# Patient Record
Sex: MALE | Employment: OTHER | URBAN - METROPOLITAN AREA
[De-identification: names, ages, dates, MRNs, and addresses within clinical notes are randomized per-mention and may not be internally consistent; named-entity substitution may affect disease eponyms.]

---

## 2021-06-22 ENCOUNTER — APPOINTMENT (OUTPATIENT)
Dept: CT IMAGING | Facility: CLINIC | Age: 66
DRG: 378 | End: 2021-06-22
Attending: EMERGENCY MEDICINE
Payer: COMMERCIAL

## 2021-06-22 ENCOUNTER — HOSPITAL ENCOUNTER (OUTPATIENT)
Facility: CLINIC | Age: 66
End: 2021-06-22
Attending: HOSPITALIST | Admitting: HOSPITALIST
Payer: COMMERCIAL

## 2021-06-22 ENCOUNTER — HOSPITAL ENCOUNTER (INPATIENT)
Facility: CLINIC | Age: 66
LOS: 2 days | Discharge: HOME | DRG: 378 | End: 2021-06-24
Attending: EMERGENCY MEDICINE | Admitting: INTERNAL MEDICINE
Payer: COMMERCIAL

## 2021-06-22 DIAGNOSIS — R91.8 PULMONARY NODULES: ICD-10-CM

## 2021-06-22 DIAGNOSIS — K92.2 GASTROINTESTINAL HEMORRHAGE, UNSPECIFIED GASTROINTESTINAL HEMORRHAGE TYPE: ICD-10-CM

## 2021-06-22 DIAGNOSIS — R55 SYNCOPE AND COLLAPSE: ICD-10-CM

## 2021-06-22 LAB
ALBUMIN SERPL-MCNC: 2.9 G/DL (ref 3.4–5)
ALP SERPL-CCNC: 64 U/L (ref 40–150)
ALT SERPL W P-5'-P-CCNC: 29 U/L (ref 0–70)
ANION GAP SERPL CALCULATED.3IONS-SCNC: 9 MMOL/L (ref 3–14)
APTT PPP: 28 SEC (ref 22–37)
AST SERPL W P-5'-P-CCNC: 16 U/L (ref 0–45)
BASOPHILS # BLD AUTO: 0 10E9/L (ref 0–0.2)
BASOPHILS NFR BLD AUTO: 0.2 %
BILIRUB SERPL-MCNC: 0.8 MG/DL (ref 0.2–1.3)
BLD PROD TYP BPU: NORMAL
BLD UNIT ID BPU: 0
BLOOD PRODUCT CODE: NORMAL
BPU ID: NORMAL
BUN SERPL-MCNC: 48 MG/DL (ref 7–30)
CALCIUM SERPL-MCNC: 7.9 MG/DL (ref 8.5–10.1)
CHLORIDE SERPL-SCNC: 108 MMOL/L (ref 94–109)
CK SERPL-CCNC: 54 U/L (ref 30–300)
CO2 SERPL-SCNC: 23 MMOL/L (ref 20–32)
CREAT SERPL-MCNC: 0.87 MG/DL (ref 0.66–1.25)
DIFFERENTIAL METHOD BLD: ABNORMAL
EOSINOPHIL # BLD AUTO: 0 10E9/L (ref 0–0.7)
EOSINOPHIL NFR BLD AUTO: 0.1 %
ERYTHROCYTE [DISTWIDTH] IN BLOOD BY AUTOMATED COUNT: 15.8 % (ref 10–15)
ETHANOL SERPL-MCNC: <0.01 G/DL
GFR SERPL CREATININE-BSD FRML MDRD: 90 ML/MIN/{1.73_M2}
GLUCOSE SERPL-MCNC: 172 MG/DL (ref 70–99)
HBA1C MFR BLD: NORMAL % (ref 0–5.6)
HCT VFR BLD AUTO: 23.3 % (ref 40–53)
HEMOCCULT STL QL: POSITIVE
HGB BLD-MCNC: 7.3 G/DL (ref 13.3–17.7)
HGB BLD-MCNC: 7.9 G/DL (ref 13.3–17.7)
IMM GRANULOCYTES # BLD: 0.2 10E9/L (ref 0–0.4)
IMM GRANULOCYTES NFR BLD: 1.3 %
INR PPP: 1.06 (ref 0.86–1.14)
INTERPRETATION ECG - MUSE: NORMAL
LABORATORY COMMENT REPORT: NORMAL
LACTATE BLD-SCNC: 3.9 MMOL/L (ref 0.7–2)
LYMPHOCYTES # BLD AUTO: 3.6 10E9/L (ref 0.8–5.3)
LYMPHOCYTES NFR BLD AUTO: 18.9 %
MCH RBC QN AUTO: 26.4 PG (ref 26.5–33)
MCHC RBC AUTO-ENTMCNC: 31.3 G/DL (ref 31.5–36.5)
MCV RBC AUTO: 84 FL (ref 78–100)
MONOCYTES # BLD AUTO: 0.8 10E9/L (ref 0–1.3)
MONOCYTES NFR BLD AUTO: 4.4 %
NEUTROPHILS # BLD AUTO: 14.3 10E9/L (ref 1.6–8.3)
NEUTROPHILS NFR BLD AUTO: 75.1 %
NRBC # BLD AUTO: 0 10*3/UL
NRBC BLD AUTO-RTO: 0 /100
PLATELET # BLD AUTO: 253 10E9/L (ref 150–450)
POTASSIUM SERPL-SCNC: 3.8 MMOL/L (ref 3.4–5.3)
PROT SERPL-MCNC: 5.8 G/DL (ref 6.8–8.8)
RBC # BLD AUTO: 2.77 10E12/L (ref 4.4–5.9)
SARS-COV-2 RNA RESP QL NAA+PROBE: NEGATIVE
SODIUM SERPL-SCNC: 140 MMOL/L (ref 133–144)
SPECIMEN SOURCE: NORMAL
TRANSFUSION STATUS PATIENT QL: NORMAL
TRANSFUSION STATUS PATIENT QL: NORMAL
TROPONIN I SERPL-MCNC: 0.02 UG/L (ref 0–0.04)
TSH SERPL DL<=0.005 MIU/L-ACNC: 3.54 MU/L (ref 0.4–4)
WBC # BLD AUTO: 19 10E9/L (ref 4–11)

## 2021-06-22 PROCEDURE — 82272 OCCULT BLD FECES 1-3 TESTS: CPT | Performed by: EMERGENCY MEDICINE

## 2021-06-22 PROCEDURE — 85730 THROMBOPLASTIN TIME PARTIAL: CPT | Performed by: EMERGENCY MEDICINE

## 2021-06-22 PROCEDURE — 83036 HEMOGLOBIN GLYCOSYLATED A1C: CPT | Performed by: EMERGENCY MEDICINE

## 2021-06-22 PROCEDURE — C9803 HOPD COVID-19 SPEC COLLECT: HCPCS

## 2021-06-22 PROCEDURE — 96365 THER/PROPH/DIAG IV INF INIT: CPT

## 2021-06-22 PROCEDURE — 87635 SARS-COV-2 COVID-19 AMP PRB: CPT | Performed by: EMERGENCY MEDICINE

## 2021-06-22 PROCEDURE — 250N000011 HC RX IP 250 OP 636: Performed by: INTERNAL MEDICINE

## 2021-06-22 PROCEDURE — P9016 RBC LEUKOCYTES REDUCED: HCPCS | Performed by: EMERGENCY MEDICINE

## 2021-06-22 PROCEDURE — 250N000009 HC RX 250: Performed by: EMERGENCY MEDICINE

## 2021-06-22 PROCEDURE — C9113 INJ PANTOPRAZOLE SODIUM, VIA: HCPCS | Performed by: INTERNAL MEDICINE

## 2021-06-22 PROCEDURE — 74177 CT ABD & PELVIS W/CONTRAST: CPT

## 2021-06-22 PROCEDURE — 83605 ASSAY OF LACTIC ACID: CPT | Performed by: EMERGENCY MEDICINE

## 2021-06-22 PROCEDURE — 86923 COMPATIBILITY TEST ELECTRIC: CPT | Performed by: EMERGENCY MEDICINE

## 2021-06-22 PROCEDURE — 250N000011 HC RX IP 250 OP 636: Performed by: EMERGENCY MEDICINE

## 2021-06-22 PROCEDURE — 36415 COLL VENOUS BLD VENIPUNCTURE: CPT | Performed by: PHYSICIAN ASSISTANT

## 2021-06-22 PROCEDURE — 82077 ASSAY SPEC XCP UR&BREATH IA: CPT | Performed by: EMERGENCY MEDICINE

## 2021-06-22 PROCEDURE — 120N000001 HC R&B MED SURG/OB

## 2021-06-22 PROCEDURE — 80053 COMPREHEN METABOLIC PANEL: CPT | Performed by: EMERGENCY MEDICINE

## 2021-06-22 PROCEDURE — 99223 1ST HOSP IP/OBS HIGH 75: CPT | Mod: AI | Performed by: INTERNAL MEDICINE

## 2021-06-22 PROCEDURE — 86900 BLOOD TYPING SEROLOGIC ABO: CPT | Performed by: EMERGENCY MEDICINE

## 2021-06-22 PROCEDURE — 85018 HEMOGLOBIN: CPT | Performed by: PHYSICIAN ASSISTANT

## 2021-06-22 PROCEDURE — 250N000011 HC RX IP 250 OP 636: Performed by: PHYSICIAN ASSISTANT

## 2021-06-22 PROCEDURE — 86850 RBC ANTIBODY SCREEN: CPT | Performed by: EMERGENCY MEDICINE

## 2021-06-22 PROCEDURE — 85610 PROTHROMBIN TIME: CPT | Performed by: EMERGENCY MEDICINE

## 2021-06-22 PROCEDURE — 87040 BLOOD CULTURE FOR BACTERIA: CPT | Performed by: EMERGENCY MEDICINE

## 2021-06-22 PROCEDURE — 258N000003 HC RX IP 258 OP 636: Performed by: EMERGENCY MEDICINE

## 2021-06-22 PROCEDURE — 84443 ASSAY THYROID STIM HORMONE: CPT | Performed by: EMERGENCY MEDICINE

## 2021-06-22 PROCEDURE — 86901 BLOOD TYPING SEROLOGIC RH(D): CPT | Performed by: EMERGENCY MEDICINE

## 2021-06-22 PROCEDURE — 96360 HYDRATION IV INFUSION INIT: CPT | Mod: 59

## 2021-06-22 PROCEDURE — 258N000003 HC RX IP 258 OP 636: Performed by: INTERNAL MEDICINE

## 2021-06-22 PROCEDURE — 99285 EMERGENCY DEPT VISIT HI MDM: CPT | Mod: 25

## 2021-06-22 PROCEDURE — 258N000003 HC RX IP 258 OP 636: Performed by: PHYSICIAN ASSISTANT

## 2021-06-22 PROCEDURE — 82550 ASSAY OF CK (CPK): CPT | Performed by: EMERGENCY MEDICINE

## 2021-06-22 PROCEDURE — 99207 PR APP CREDIT; MD BILLING SHARED VISIT: CPT | Performed by: PHYSICIAN ASSISTANT

## 2021-06-22 PROCEDURE — C9113 INJ PANTOPRAZOLE SODIUM, VIA: HCPCS | Performed by: EMERGENCY MEDICINE

## 2021-06-22 PROCEDURE — 93005 ELECTROCARDIOGRAM TRACING: CPT

## 2021-06-22 PROCEDURE — 84484 ASSAY OF TROPONIN QUANT: CPT | Performed by: EMERGENCY MEDICINE

## 2021-06-22 PROCEDURE — 85025 COMPLETE CBC W/AUTO DIFF WBC: CPT | Performed by: EMERGENCY MEDICINE

## 2021-06-22 RX ORDER — PROCHLORPERAZINE MALEATE 5 MG
5 TABLET ORAL EVERY 6 HOURS PRN
Status: DISCONTINUED | OUTPATIENT
Start: 2021-06-22 | End: 2021-06-24 | Stop reason: HOSPADM

## 2021-06-22 RX ORDER — IOPAMIDOL 755 MG/ML
500 INJECTION, SOLUTION INTRAVASCULAR ONCE
Status: COMPLETED | OUTPATIENT
Start: 2021-06-22 | End: 2021-06-22

## 2021-06-22 RX ORDER — BISACODYL 10 MG
10 SUPPOSITORY, RECTAL RECTAL DAILY PRN
Status: DISCONTINUED | OUTPATIENT
Start: 2021-06-22 | End: 2021-06-24 | Stop reason: HOSPADM

## 2021-06-22 RX ORDER — MULTIPLE VITAMINS W/ MINERALS TAB 9MG-400MCG
1 TAB ORAL DAILY
COMMUNITY

## 2021-06-22 RX ORDER — PROCHLORPERAZINE 25 MG
12.5 SUPPOSITORY, RECTAL RECTAL EVERY 12 HOURS PRN
Status: DISCONTINUED | OUTPATIENT
Start: 2021-06-22 | End: 2021-06-24 | Stop reason: HOSPADM

## 2021-06-22 RX ORDER — AMOXICILLIN 250 MG
1 CAPSULE ORAL 2 TIMES DAILY PRN
Status: DISCONTINUED | OUTPATIENT
Start: 2021-06-22 | End: 2021-06-24 | Stop reason: HOSPADM

## 2021-06-22 RX ORDER — ONDANSETRON 2 MG/ML
4 INJECTION INTRAMUSCULAR; INTRAVENOUS EVERY 6 HOURS PRN
Status: DISCONTINUED | OUTPATIENT
Start: 2021-06-22 | End: 2021-06-24 | Stop reason: HOSPADM

## 2021-06-22 RX ORDER — SODIUM CHLORIDE 9 MG/ML
INJECTION, SOLUTION INTRAVENOUS CONTINUOUS
Status: DISCONTINUED | OUTPATIENT
Start: 2021-06-22 | End: 2021-06-22

## 2021-06-22 RX ORDER — AMOXICILLIN 250 MG
2 CAPSULE ORAL 2 TIMES DAILY PRN
Status: DISCONTINUED | OUTPATIENT
Start: 2021-06-22 | End: 2021-06-24 | Stop reason: HOSPADM

## 2021-06-22 RX ORDER — ACETAMINOPHEN 325 MG/1
650 TABLET ORAL EVERY 4 HOURS PRN
Status: DISCONTINUED | OUTPATIENT
Start: 2021-06-22 | End: 2021-06-24 | Stop reason: HOSPADM

## 2021-06-22 RX ORDER — LIDOCAINE 40 MG/G
CREAM TOPICAL
Status: DISCONTINUED | OUTPATIENT
Start: 2021-06-22 | End: 2021-06-24 | Stop reason: HOSPADM

## 2021-06-22 RX ORDER — ONDANSETRON 4 MG/1
4 TABLET, ORALLY DISINTEGRATING ORAL EVERY 6 HOURS PRN
Status: DISCONTINUED | OUTPATIENT
Start: 2021-06-22 | End: 2021-06-24 | Stop reason: HOSPADM

## 2021-06-22 RX ADMIN — SODIUM CHLORIDE 8 MG/HR: 9 INJECTION, SOLUTION INTRAVENOUS at 23:34

## 2021-06-22 RX ADMIN — SODIUM CHLORIDE 500 ML: 9 INJECTION, SOLUTION INTRAVENOUS at 14:32

## 2021-06-22 RX ADMIN — SODIUM CHLORIDE 1000 ML: 9 INJECTION, SOLUTION INTRAVENOUS at 11:56

## 2021-06-22 RX ADMIN — PANTOPRAZOLE SODIUM 80 MG: 40 INJECTION, POWDER, FOR SOLUTION INTRAVENOUS at 11:54

## 2021-06-22 RX ADMIN — SODIUM CHLORIDE 65 ML: 9 INJECTION, SOLUTION INTRAVENOUS at 13:30

## 2021-06-22 RX ADMIN — IOPAMIDOL 100 ML: 755 INJECTION, SOLUTION INTRAVENOUS at 13:30

## 2021-06-22 RX ADMIN — SODIUM CHLORIDE 8 MG/HR: 9 INJECTION, SOLUTION INTRAVENOUS at 13:23

## 2021-06-22 RX ADMIN — POTASSIUM CHLORIDE: 2 INJECTION, SOLUTION, CONCENTRATE INTRAVENOUS at 19:44

## 2021-06-22 ASSESSMENT — ENCOUNTER SYMPTOMS
LIGHT-HEADEDNESS: 1
BLOOD IN STOOL: 1
WEAKNESS: 1

## 2021-06-22 ASSESSMENT — MIFFLIN-ST. JEOR
SCORE: 2288.12
SCORE: 2183.79

## 2021-06-22 ASSESSMENT — ACTIVITIES OF DAILY LIVING (ADL): ADLS_ACUITY_SCORE: 16

## 2021-06-22 NOTE — PHARMACY-ADMISSION MEDICATION HISTORY
Admission medication history interview status for this patient is complete. See [facility] admission navigator for allergy information, prior to admission medications and immunization status.     Medication history interview done, indicate source(s): Patient  Medication history resources (including written lists, pill bottles, clinic record):None  Pharmacy: N/A    Changes made to PTA medication list:  Added: Excedrin  Deleted: None  Changed: None    Actions taken by pharmacist (provider contacted, etc):None     Additional medication history information:None    Medication reconciliation/reorder completed by provider prior to medication history?  No        Prior to Admission medications    Medication Sig Last Dose Taking? Auth Provider   aspirin-acetaminophen-caffeine (EXCEDRIN MIGRAINE) 250-250-65 MG tablet Take 1 tablet by mouth every 6 hours as needed for headaches Past Week at Unknown time Yes Unknown, Entered By History   multivitamin w/minerals (MULTI-VITAMIN) tablet Take 1 tablet by mouth daily 6/21/2021 at Unknown time Yes Unknown, Entered By History

## 2021-06-22 NOTE — ED NOTES
Bed: ED19  Expected date: 6/22/21  Expected time: 11:06 AM  Means of arrival: Ambulance  Comments:  BV 65 M possible GI bleed

## 2021-06-22 NOTE — ED PROVIDER NOTES
History   Chief Complaint:  Melena and Syncope       HPI   Mason Garibay is a 65 year old male with history of morbid obesity and an adenomatous polyp of colon who presents with melena and syncope. The patient reports that he started experiencing weakness and lightheadedness three days ago after a Father's Day party at his son's house. Yesterday, everytime the patient stood up to use the restroom, he experienced shakiness, nausea, and lightheadedness. At one point during the day, he blacked out. When he woke up, he was lying on the floor. He does not know how this occurred or how long he was on the floor. This morning, he told his son about the incident, who called EMS. His last bowel movement at 0000 this morning was dark in coloration. He experienced similar stools seven or eight years ago. A colonoscopy was performed at this time, with no significant abnormal findings. Excedrin is the only medication the patient uses regularly. He last took this 4-5 days ago.       Review of Systems   Gastrointestinal: Positive for blood in stool.   Neurological: Positive for syncope, weakness and light-headedness.   All other systems reviewed and are negative.        Allergies:  Clindamycin    Medications:  No known current medications    Past Medical History:    Morbid obesity  Umbilical hernia  Adenomatous polyp of colon  Glucose intolerance    Past Surgical History:    Polypectomy    Family History:    Diabetes, mother  Hypertension, mother  Depression, mother  Thyroid disease, father  Respiratory, father    Social History:  Arrives via EMS  Unaccompanied in ED    Physical Exam     Patient Vitals for the past 24 hrs:   BP Temp Temp src Pulse Resp SpO2 Height Weight   06/22/21 1515 116/85 -- -- 112 17 99 % -- --   06/22/21 1500 115/83 -- -- 110 14 99 % -- --   06/22/21 1445 121/82 98  F (36.7  C) Oral 112 14 98 % -- --   06/22/21 1430 106/76 99.1  F (37.3  C) Oral 112 20 100 % -- --   06/22/21 1415 117/85 98.9  F (37.2  C) --  118 16 99 % -- --   06/22/21 1400 117/77 -- -- 108 14 100 % -- --   06/22/21 1315 118/76 -- -- 113 12 -- -- --   06/22/21 1300 131/88 -- -- 113 24 96 % -- --   06/22/21 1250 -- -- -- 117 25 96 % -- --   06/22/21 1245 (!) 123/96 -- -- 114 13 98 % -- --   06/22/21 1240 -- -- -- 115 28 98 % -- --   06/22/21 1235 -- -- -- 116 28 96 % -- --   06/22/21 1230 121/78 -- -- 111 15 96 % -- --   06/22/21 1220 -- -- -- 113 16 96 % -- --   06/22/21 1215 115/79 -- -- 117 (!) 6 96 % -- --   06/22/21 1210 -- -- -- 121 21 99 % -- --   06/22/21 1205 -- -- -- 117 10 (!) 86 % -- --   06/22/21 1200 124/78 -- -- 117 16 96 % -- --   06/22/21 1145 (!) 139/92 -- -- 117 17 98 % -- --   06/22/21 1130 (!) 153/111 -- -- 121 8 98 % -- --   06/22/21 1118 (!) 160/90 98.4  F (36.9  C) Oral 125 18 97 % 1.829 m (6') 136.1 kg (300 lb)   06/22/21 1115 (!) 144/103 -- -- 125 26 97 % -- --       Physical Exam  General: The patient is alert, in no respiratory distress.    HENT: Mucous membranes moist.    Cardiovascular: Regular rate and rhythm. Good pulses in all four extremities. Normal capillary refill and skin turgor.     Respiratory: Lungs are clear. No nasal flaring. No retractions. No wheezing, no crackles.    Gastrointestinal: Abdomen soft. No guarding, no rebound. No palpable hernias.     Musculoskeletal: No gross deformity.     Skin: No rashes or petechiae. Pale.     Neurologic: The patient is alert and oriented x3. GCS 15. No testable cranial nerve deficit. Follows commands with clear and appropriate speech. Gives appropriate answers. Good strength in all extremities. No gross neurologic deficit. Gross sensation intact. Pupils are round and reactive. No meningismus.     Lymphatic: No cervical adenopathy. No lower extremity swelling.    Psychiatric: The patient is non-tearful.    Rectal: Black stool    Emergency Department Course   ECG  ECG taken at 1117, ECG read at 1130  Sinus tachycardia with occasional premature ventricular  complexes  Nonspecific ST abnormality  Abnormal ECG   Rate 124 bpm. ID interval 156 ms. QRS duration 84 ms. QT/QTc 322/462 ms. P-R-T axes 31 -15 14.     Imaging:    CT Abdomen Pelvis w Contrast  1. No evidence of acute pathology in the abdomen or pelvis.  2. A few basilar pulmonary nodules including 4 mm right middle and  lower lobe nodules , as per Fleischner's society criteria, for low  risk patient no routine follow-up is recommended, and for high-risk  patient, optional chest CT in 12 months can be considered.  3. Multiple small nodular groundglass opacities in the subcutaneous  tissue of the anterior abdominal wall, likely related to medications  injections.    CHARAN RODRIGUEZ MD    Reads per radiology    Laboratory:    CBC: WBC 19.0 (H), HGB 7.3 (L),     CMP: glucose 172 (H), BUN 48 (H), calcium 7.9 (L), albumin 2.9 (L), pretein total 5.8 (L) o/w WNL (Creatinine 0.87)     INR: 1.06    PTT: 28    Troponin (Collected 1123): 0.020    ABO RH Type and Screen: O, antibody Positive    Alcohol level blood: <0.01    Emergency Department Course:    Reviewed:  I reviewed nursing notes, vitals, past medical history and care everywhere    Assessments:  1110 I obtained history and examined the patient as noted above.   1132 I rechecked the patient  1204 I rechecked the patient.   1358 I rechecked the patient. His heart rate has decreased to 118 bpm.   1411 I rechecked the patient and explained finding. He was already aware of the nodule in his lung.     Consults:   1214 I consulted with Dr. Chris MD, of GI  1421 I consulted with CARLOS Mcknight PA-C, for Dr. Rakan MD, of the hospitalist service, who agrees to admit the patient.   1500 No beds available at [facility], so the patient will be transferred to [place]  1530 I consulted with Dr. Zo MD, of the hospitalist service, who agrees to accept the patient for transfer at [facility].    Interventions:  1154 Pantoprazole 80 mg IV  1156 Normal Saline 1000 mL  IV  1323 Protonix 8 mg/hr IV  1432 Normal Saline 500 mL IV    Disposition:  The patient was admitted to the hospital under the care of Dr. Zo MD.      Impression & Plan       Medical Decision Making:  Patient presented after a syncopal episode with black stools.  With his history of previous GI bleed I was highly concerned about a GI bleed.  He was also very tachycardic.  I did aggressively hydrate him I do think that his elevated lactic acid level is likely secondary to dehydration.  The patient has a benign abdominal exam has a hernia present I did consider mesenteric ischemia or diverticulitis and therefore the CT scan thankfully is not present.  Discussed incidental findings with him.  He was transfused and given IV fluid for hydration.  I held on antibiotics and I do not think he is likely septic.  I discussed the case with GI and is due for upper endoscopy blood transfusion was started here.  Originally thought the patient would have to be transported to [facility] however we will find a bed for him here I talked to the hospitalist here as well as at [facility].  His heart rate came down he was very stable and the patient was admitted to hospital in good condition receiving blood and IV fluid.    Critical Care Time: was 75  minutes for this patient excluding procedures    Covid-19  Mason Garibay was evaluated during a global COVID-19 pandemic, which necessitated consideration that the patient might be at risk for infection with the SARS-CoV-2 virus that causes COVID-19.   Applicable protocols for evaluation were followed during the patient's care.   COVID-19 was considered as part of the patient's evaluation. The plan for testing is:  a test was obtained during this visit.    Diagnosis:    ICD-10-CM    1. Gastrointestinal hemorrhage, unspecified gastrointestinal hemorrhage type  K92.2 Blood culture     Blood culture   2. Syncope and collapse  R55    3. Pulmonary nodules  R91.8      Scribe Disclosure:  I  Ari Bourgeois, am serving as a scribe at 11:15 AM on 6/22/2021 to document services personally performed by Alex Hargrove MD based on my observations and the provider's statements to me.              Alex Hargrove MD  06/22/21 1550

## 2021-06-22 NOTE — ED TRIAGE NOTES
Patient arrives via EMS from home, states that he had black stools two times since yesterday, syncopal episode today, unwitnessed.  via EMS.     Patient tachy upon arrival, otherwise vitally stable. A&O x4.     Julianna Raymond RN

## 2021-06-22 NOTE — CONSULTS
[facility]  Gastroenterology Consultation         Levi Perez MD    Patient Name: Mason Garibay MRN# [id number]   YOB: 1955 Age: 65 year old      Date of Admission:  6/22/2021  Today's Date: 06/22/2021         Assessment and Plan:     Impression:  1.  65-year-old gentleman with what appears to be upper GI bleeding almost certainly from nonsteroidal/aspirin based ulceration/gastropathy/duodenoscopy.  Hemodynamics improving after fluid administration.  Blood has been ordered.  Leukocytosis secondary likely from demargination from acute illness, lactic acid likely elevated due to dehydration.  Page-Byers tear, AVMs of the stomach and small bowel much less likely.  No history to suggest lower GI bleeding or variceal upper GI bleeding.  2.  Stable medical problems otherwise under the care admitting team and outpatient provider.    Recommendations:  1.  Case and findings discussed with emergency room staff.  2.  Recommend admission and appropriate fluid resuscitation including blood product.  3.  If hemodynamics remain stable as anticipated, recommend EGD tomorrow after fluid resuscitated and lactic acid back to normal.  4.  Monitor H&H and transfuse if indicated.  5.  Continue pantoprazole drip as currently started in the emergency department.  6.  N.p.o. except ice chips.  7.  If patient continues to manifest hemodynamic instability or active GI bleeding, i.e. hematemesis or ongoing melena with hemodynamic instability, would perform EGD at that point on an urgent basis.  8.  We will follow hospitalized.  9.  Please call any questions.  10.  Thank you for allowing me to participate in this patient's health care.  Please call any further questions.              Primary Care Physician:     No Ref-Primary, Physician None            Requesting Physician:        Emergency room medical staff         Chief Complaint:     Melena         History of Present Illness:     Mason Garibay is a  65 year old male who presented melena.  There was a report of weakness and lightheadedness 3 days ago after a get together on Father's Day at his son's house.  It is reported yesterday the patient was feeling lightheaded when he stood up or got up from a chair including nausea.  Yesterday it was some point he had a syncopal episode.  He reports waking up lying on the floor.  Loss of consciousness length unknown.  No known history of hitting his head.  He discussed this with his son this morning and he was brought to the emergency room.  It is reported his last bowel movement at midnight was dark in color.  Patient reports dark stool/black stool 2 days prior to admission as well.  There is a report of similar symptoms 7 or 8 years ago during which a colonoscopy was performed and he reports that this was normal.  The patient does take Excedrin on a regular basis, 2 tablets at least twice weekly, likely as the cause of bleeding in the form of ulceration or gastropathy from nonsteroidal/aspirin.  There is no history consistent with cirrhosis or significant concern for alcoholic liver disease or variceal bleeding.  Clinical history is not consistent with lower GI bleeding.  Last known hemoglobin was normal.  Hemoglobin today in the emergency room is 7.3 with a creatinine which is normal and a BUN of 48 consistent with an upper GI bleed.           Past Medical History:     No past medical history on file.          Past Surgical History:     Past Surgical History:   Procedure Laterality Date     NONSPECIFIC PROCEDURE      Nasal polyp surgery            Home Medications:     Prior to Admission medications    Medication Sig Last Dose Taking? Auth Provider   MULTIPLE VITAMINS OR TABS                Current Medications:                    Allergies:     Allergies   Allergen Reactions     No Known Drug Allergies             Social History:     Mason Garibay  reports that he has never smoked. He does not have any smokeless tobacco  history on file. He reports current alcohol use. He reports that he does not use drugs.          Family History:     Family History   Problem Relation Age of Onset     Diabetes Mother         type 2     Hypertension Mother      Depression Mother      Thyroid Disease Father         unsure     Respiratory Father         emphysema-smoker     Genitourinary Problems Father         unsure             Review of Systems:     Comprehensive GI review of systems is completed and is negative except as above.             Physical Exam:     Vital Signs with Ranges  Temp:  [98.4  F (36.9  C)] 98.4  F (36.9  C)  Pulse:  [111-125] 117  Resp:  [6-28] 25  BP: (115-160)/() 123/96  SpO2:  [86 %-99 %] 96 %  I/O's Last 24 hours  No intake/output data recorded.    Constitutional:  Alert and no distress.   HEENT: PERRL, EOMI, sclera nonicteric, conjunctiva pale and moist.  NECK: Supple, nontender. No lymphadenopathy, JVD or bruits noted.  PULMONARY: Clear to auscultation bilaterally.  CARDIOVASCULAR: S1, S2, no S3, no S4, no murmur, regular rate and rhythm  ABDOMEN: Obese, soft, nontender, nondistended, no tympany, no organomegaly, no fullness, guarding or rebound are noted.   NEURO: Alert and oriented to place, time and person. Neurological exam grossly nonfocal, CN II through XII are grossly intact. Detailed neurological exam is not performed.  EXT: No cyanosis, clubbing or edema.         Data:   All new lab and imaging data was reviewed.  .  Recent Labs   Lab Test 06/22/21  1123   WBC 19.0*   HGB 7.3*   MCV 84      INR 1.06     Recent Labs   Lab Test 06/22/21  1123      POTASSIUM 3.8   CHLORIDE 108   CO2 23   BUN 48*   CR 0.87   ANIONGAP 9     Recent Labs   Lab Test 06/22/21  1123   ALBUMIN 2.9*   BILITOTAL 0.8   ALT 29   AST 16   ALKPHOS 64            Levi Perez MD, FACG  [facility]  [phone]

## 2021-06-22 NOTE — PLAN OF CARE
Pt arrived to floor approx 1800, Pt resting comfortably. VSS on RA. Denies pain. A&O. protonix gtt infusing. Transfers with Ax1. Had one maroon/red formed BM. Tolerating ice chips w/o N/V. Plan for NPO at 0000 or EGD tomorrow.

## 2021-06-22 NOTE — H&P
[facility]  History and Physical  Hospitalist       Date of Admission:  6/22/2021    Assessment & Plan   Mason Garibay is a 65 year old male with hyperglycemia/impaired fasting glucose, obesity, positive PPD, and history of remote GIB who presented to [facility] ED on 6/22/2021 after a syncopal episode and was found to have a Hgb of 7.3.  CMP notable for BUN 48 (H).  CBC also notable for WBC 19, Plt 253, and left shift of 14.3.  Lactate 3.9.  INR 1.06.  Occult guaiac positive.   CT Abd/Pelv negative for acute pathology in the abdomen or pelvis and few pulmonary nodules and multiple small nodular subcutaneous GGO in the anterior abdominal wall.  Admission requested.     Suspected UGIB   Acute blood loss anemia  Black, tarry stools coupled with syncope, elevated BUN, and Hgb 7.3 suggestive of UGIB.  Risk factors include NSAID use for headache.  Has remote history of GIB in 2011 with Hgb to 8.1.  Last Hgb 14.2 in 2016.  GI saw in ED, recommends PPI, NPO, and anticipates EGD in AM.  - Continue PPI gtt  - NPO save for ice chips until midnight, then strict NPO  - Trend Hgb  - Transfuse 1u pRBC for Hgb <7 (conditional orders in place)     Syncope  Likely related to orthostatic hypotension and dehydration in setting of UGIB.  No reports of chest pain or palpitations to raise suspicion for cardiac etiology.  Is on no PTA antihypertensives.  Was down for an undetermined amount of time so will add on CK.  Defer syncope work-up for time being.       Tachycardia  Likely related to fluid shifts and volume losses.  Getting 1u pRBC.  Continue IV fluids.  Currently RRR.  No h/o arrhythmia.  Low threshold for tele.       Hyperglycemia / impaired fasting glucose  Has not been to a doctor in about 5 years.    - Check HgbA1c.      Risk Factors Present on Admission              # Platelet Defect: home medication list includes an antiplatelet medication      COVID status:   Negative  DVT Prophylaxis: Low Risk/Ambulatory with  no VTE prophylaxis indicated and Pneumatic Compression Devices  Code Status:  Full    Disposition: Expected discharge in 3-4 days pending results of EGD and stability of Hgb      Tete Mcknight, PAC  Hospitalist Service  [facility]  Text Page (7AM - 5PM, M-F)      PRIMARY CARE PROVIDER: Physician No Ref-Primary    CHIEF COMPLAINT  Syncope    History is obtained from the patient, son, and EMR    HISTORY OF PRESENT ILLNESS  Mason Garibay is a 65 year old male with hyperglycemia/impaired fasting glucose, obesity, positive PPD, and history of remote GIB who presented to [facility] ED on 6/22/2021 after a syncopal episode.  Patient has not been to a doctor in about 5 years.  He was last seen in 2016 at which time Hgb 14.  Has h/o colon polyps.  Had a GI bleed in 2011 which was reportedly related to ulcers.  Was not placed on any medications at that time.  Also reportedly had a pill cam study.  Over the few days, has had episodes of lightheadedness, weakness, and dizziness when standing.  He had an episode while at his son's house for Father's Day and attributed it to drinking alcohol (which he doesn't do very often).  Yesterday, he actually had a syncopal episode and woke up on the floor.  He does not know how long he was unconscious.  He told his son about it today and his son advised him to come in to the ER for evaluation.      In the ED, /103, , RR 26, SpO2 97% on RA, temp 98.4.  CMP notable for BUN 48 (H).  CBC also notable for WBC 19, Hgb 7.3, Plt 253, and left shift of 14.3.  Lactate 3.9.  INR 1.06.  Occult guaiac positive.  CT Abd/Pelv negative for acute pathology in the abdomen or pelvis and few pulmonary nodules and multiple small nodular subcutaneous GGO in the anterior abdominal wall.  Patient type and crossed.  1u pRBC ordered and currently transfusing.  HR now in the 90s.  Admission requested.    Patient denies fever, chills, shortness of breath, chest pain, abdominal  pain/cramping, dysuria, increased LE edema.  He reports some nausea, weakness, lightheadedness/dizziness particularly when standing up, and also had some black stools today.  Last BM around midnight.  He uses Excedrin a couple times a week for headaches.  He drinks occasionally, but not often.  No family history of stomach or colon cancers.     PAST MEDICAL HISTORY  1. GI bleed 2011 related to ulcers, per patient  2. Impaired fasting glucose  3. Obesity  4. Positive PPD    PAST SURGICAL HISTORY  I have reviewed this patient's surgical history and updated it with pertinent information if needed.  Past Surgical History:   Procedure Laterality Date     NONSPECIFIC PROCEDURE      Nasal polyp surgery   Colonoscopy    PRIOR TO ADMISSION MEDICATIONS  Prior to Admission Medications   Prescriptions Last Dose Informant Patient Reported? Taking?   aspirin-acetaminophen-caffeine (EXCEDRIN MIGRAINE) 250-250-65 MG tablet Past Week at Unknown time  Yes Yes   Sig: Take 1 tablet by mouth every 6 hours as needed for headaches   multivitamin w/minerals (MULTI-VITAMIN) tablet 6/21/2021 at Unknown time  Yes Yes   Sig: Take 1 tablet by mouth daily      Facility-Administered Medications: None       ALLERGIES  Allergies   Allergen Reactions     No Known Drug Allergies        SOCIAL HISTORY  Single.  Lives alone in a house.  No tobacco use.  Occasional alcohol use.  Son lives nearby.  Typically IADL.     FAMILY HISTORY  I have reviewed this patient's family history and updated it with pertinent information if needed.   Family History   Problem Relation Age of Onset     Diabetes Mother         type 2     Hypertension Mother      Depression Mother      Thyroid Disease Father         unsure     Respiratory Father         emphysema-smoker     Genitourinary Problems Father         unsure   Mother had heart problems.      REVIEW OF SYSTEMS:  14 point comprehensive ROS undertaken with pertinent positives and negatives as above and otherwise  unremarkable.     PHYSICAL EXAM  Temp: 99.1  F (37.3  C) Temp src: Oral BP: 106/76 Pulse: 112   Resp: 20 SpO2: 100 % O2 Device: None (Room air)    Vital Signs with Ranges  Temp:  [98.4  F (36.9  C)-99.1  F (37.3  C)] 99.1  F (37.3  C)  Pulse:  [108-125] 112  Resp:  [6-28] 20  BP: (106-160)/() 106/76  SpO2:  [86 %-100 %] 100 %  300 lbs 0 oz    GENERAL:  Pleasant, cooperative, alert. Well developed, well nourished.  Son at bedside.   HEENT: Normocephalic, atraumatic.  Extra occular mm intact.  Sclera clear. PERRL.  Mucous membranes moist.     PULMONARY: Clear to auscultation bilaterally .  CARDIAC: Regular rate and rhythm.  No appreciated murmur.     ABDOMEN: Soft, obese, nontender non distended.    MUSCULOSKELETAL:  Moving x 4 spontaneously with CMS intact x4.  Normal bulk and tone.  Trace nonpitting LE edema without venous stasis dermatitis.  Pedal pulses 2+ bilaterally.    NEURO: Alert and oriented x3.  CN II-XII grossly intact and symmetric.  No ataxia or tremor.  Nonfocal exam.  SKIN:  Warm, pink, dry.    DATA  Data reviewed today:  I personally reviewed no images or EKG's today.    Recent Labs   Lab 06/22/21  1123   WBC 19.0*   HGB 7.3*   MCV 84      INR 1.06      POTASSIUM 3.8   CHLORIDE 108   CO2 23   BUN 48*   CR 0.87   ANIONGAP 9   BINDU 7.9*   *   ALBUMIN 2.9*   PROTTOTAL 5.8*   BILITOTAL 0.8   ALKPHOS 64   ALT 29   AST 16   TROPI 0.020       Recent Results (from the past 24 hour(s))   CT Abdomen Pelvis w Contrast    Narrative    CT ABDOMEN AND PELVIS WITH CONTRAST   6/22/2021 1:39 PM     HISTORY: Black stool, possible diverticulitis suspected.    TECHNIQUE:  CT abdomen and pelvis with 100mL Isovue-370 IV. Radiation  dose for this scan was reduced using automated exposure control,  adjustment of the mA and/or kV according to patient size, or iterative  reconstruction technique.    COMPARISON: None available    FINDINGS:  Lower chest: A few basilar pulmonary nodules including 4 mm  right  lower lobe nodule (series 4 image 2) and 4 mm right middle lobe nodule  (series 4 image 7).    Abdomen/pelvis:    Hepatobiliary: A few subcentimeter hypoattenuating foci in the liver,  too small to characterize. The gallbladder is unremarkable.    Pancreas: No main pancreatic ductal dilatation or definite solid  pancreatic mass.    Spleen: No splenomegaly. Moderate-sized splenule along the splenic  hilum.    Adrenal glands: No adrenal nodules.    Kidneys: No radiodense kidney stones or hydronephrosis in either  kidney.    Bowel: No abnormally dilated bowel loops. The appendix is visualized  and appears normal.    Peritoneum: No significant free fluid in the abdomen or pelvis. No  free peritoneal or portal venous gas.    Pelvic organs: Unremarkable.    Vascular: Scattered atherosclerotic vascular calcification of the  abdominal aorta and iliac vessels.    Lymph nodes: Multiple prominent inguinal lymph nodes, indeterminate,  could be reactive, otherwise no significant abdominopelvic  lymphadenopathy.    Bones and soft tissue: Mild degenerative changes of the spine.  Moderate size fat-containing umbilical hernia. Multiple small  groundglass nodular opacities in the subcutaneous tissue of the  anterior abdominal wall, could be related to medication injections.      Impression    IMPRESSION:   1. No evidence of acute pathology in the abdomen or pelvis.  2. A few basilar pulmonary nodules including 4 mm right middle and  lower lobe nodules , as per Fleischner's society criteria, for low  risk patient no routine follow-up is recommended, and for high-risk  patient, optional chest CT in 12 months can be considered.  3. Multiple small nodular groundglass opacities in the subcutaneous  tissue of the anterior abdominal wall, likely related to medications  injections.    CHARAN RODRIGUEZ MD

## 2021-06-22 NOTE — ED NOTES
[facility]  ED Nurse Handoff Report    Mason Garibay is a 65 year old male   ED Chief complaint: Melena and Syncope  . ED Diagnosis:   Final diagnoses:   None     Allergies:   Allergies   Allergen Reactions     No Known Drug Allergies        Code Status: Full Code  Activity level - Baseline/Home:  Independent. Activity Level - Current:   Independent. Lift room needed: No. Bariatric: No   Needed: No   Isolation: No. Infection: Not Applicable.     Vital Signs:   Vitals:    06/22/21 1115 06/22/21 1118 06/22/21 1145   BP: (!) 144/103 (!) 160/90 (!) 139/92   Pulse: 125 125 117   Resp: 26 18 17   Temp:  98.4  F (36.9  C)    TempSrc:  Oral    SpO2: 97% 97% 98%   Weight:  136.1 kg (300 lb)    Height:  1.829 m (6')        Cardiac Rhythm:  ,      Pain level:    Patient confused: No. Patient Falls Risk: No.   Elimination Status: Has voided who presents with  Patient Report - Initial Complaint: . Focused Assessment:     13:29 Gastrointestinal Gastrointestinal - Gastrointestinal WDL: .WDL except; GI symptoms; stool  Stool Color: dark red; black (pt reports had black stools at home)             RICKY Garibay is a 65 year old male who presents with reports of bloody dark stools x 2 since yesterday. The patient reports that he started experiencing weakness and lightheadedness three days ago after a Father's Day party at his son's house. Yesterday, everytime the patient stood up to use the restroom, he experienced shakiness, nausea, and lightheadedness. At one point during the day, he blacked out. When he woke up, he was lying on the floor. He does not know how this occurred or how long he was on the floor. This morning, he told his son about the incident, who called EMS. His last bowel movement at 0000 this morning was dark in coloration. He experienced similar stools seven or eight years ago. A colonoscopy was performed at this time, with no significant abnormal finding. Excedrin is the only medication  the patient uses regularly. He last took this 4-5 days ago.         Review of Systems       Allergies:  Clindamycin     Medications:  MULTIPLE VITAMINS OR TABS           Past Medical History:    Morbid obesity  Umbilical hernia  Adenomatous polyp of colon  Glucose intolerance     Past Surgical History:    Polypectomy     Family History:    Diabetes, mother  Hypertension, mother  Depression, mother  Thyroid disease, father  Respiratory, father     Social History:     Physical Exam      No data found.     Physical Exam  General: The patient is alert, in no respiratory distress.     HENT: Mucous membranes moist.     Cardiovascular: Regular rate and rhythm. Good pulses in all four extremities. Normal capillary refill and skin turgor.      Respiratory: Lungs are clear. No nasal flaring. No retractions. No wheezing, no crackles.     Gastrointestinal: Abdomen soft. No guarding, no rebound. No palpable hernias.      Musculoskeletal: No gross deformity.      Skin: No rashes or petechiae. Pale.      Neurologic: The patient is alert and oriented x3. GCS 15. No testable cranial nerve deficit. Follows commands with clear and appropriate speech. Gives appropriate answers. Good strength in all extremities. No gross neurologic deficit. Gross sensation intact. Pupils are round and reactive. No meningismus.      Lymphatic: No cervical adenopathy. No lower extremity swelling.     Psychiatric: The patient is non-tearful.     Rectal: Black stool     Emergency Department Course   ECG  ECG taken at 1117, ECG read at 1130  Sinus tachycardia with occasional premature ventricular complexes  Nonspecific ST abnormality  Abnormal ECG   Rate 124 bpm. DE interval 156 ms. QRS duration 84 ms. QT/QTc 322/462 ms. P-R-T axes 31 -15 14.      Imaging:  No orders to display       Tests Performed: Labs, ekg. Abnormal Results: .   Treatments provided: Protonix. blood  Family Comments: Son at bedside with patient.   OBS brochure/video discussed/provided to  patient:  No  ED Medications:   Medications   0.9% sodium chloride BOLUS (1,000 mLs Intravenous New Bag 6/22/21 1156)     Followed by   sodium chloride 0.9% infusion (has no administration in time range)   pantoprazole (PROTONIX) 80 mg in sodium chloride 0.9 % 100 mL infusion (has no administration in time range)   pantoprazole (PROTONIX) IV push injection 80 mg (80 mg Intravenous Given 6/22/21 1154)     Drips infusing:  Yes, blood, protonix  For the majority of the shift, the patient's behavior Green. Interventions performed were .    Sepsis treatment initiated: No     Patient tested for COVID 19 prior to admission: YES    ED Nurse Name/Phone Number: Gracy Bobo RN,   12:42 PM  RECEIVING UNIT ED HANDOFF REVIEW    Above ED Nurse Handoff Report was reviewed: Yes  Reviewed by: Ilda Wild RN on June 22, 2021 at 5:17 PM

## 2021-06-22 NOTE — H&P
"Patient seen and examined in the ER.      See admit H&P from Tete Mcknight, SHANTEL    Pt presents with dark black stool and pre-syncopal symptoms x 2 days.  No BRBPR.  No CP.  Did experience SOB and a \"racing heart\" the past 2 days.    Reports hx of previous GIB - was diagnosed with a small intestine ulceration via capsule endoscopy approx 8 years ago.      Currently takes occasional Excedrin    On presentation he is tachycardic with SBP in the 100-120 range.      He was started on PPI gtt and is being administered PRBC now.      Exam:  BP (!) 108/23   Pulse 111   Temp 98  F (36.7  C) (Oral)   Resp 15   Ht 1.829 m (6')   Wt 136.1 kg (300 lb)   SpO2 98%   BMI 40.69 kg/m       Last 24 hours Vitals signs,imaging,microbiology;laboratory results were reviewed by me in EPIC  GENERAL:  Comfortable.  PSYCH: pleasant, oriented, No acute distress.  HEART:  Normal S1, S2 with no edema.  LUNGS:  Clear to auscultation, normal Respiratory effort.  ABDOMEN:  Soft, no hepatosplenomegaly, normal bowel sounds.  Non-tender umbilical hernia  SKIN:  Dry to touch, No rash.       Assessment:  GIB, presumed upper and possibly from NSAID use  Near syncope due to GIB    Plan:  - PRBC tx   - follow q6h hgb  - GI consulted and planning on EGD in AM.   - PPI gtt overnight  - NPO   Being admitted to the ICU given lack of beds elsewhere in the hospital.      "

## 2021-06-23 LAB
ABO + RH BLD: NORMAL
ABO + RH BLD: NORMAL
ANION GAP SERPL CALCULATED.3IONS-SCNC: 3 MMOL/L (ref 3–14)
BLD GP AB SCN SERPL QL: NORMAL
BLD PROD TYP BPU: NORMAL
BLD UNIT ID BPU: 0
BLOOD BANK CMNT PATIENT-IMP: NORMAL
BLOOD PRODUCT CODE: NORMAL
BPU ID: NORMAL
BUN SERPL-MCNC: 32 MG/DL (ref 7–30)
CALCIUM SERPL-MCNC: 7.4 MG/DL (ref 8.5–10.1)
CAPILLARY BLOOD COLLECTION: NORMAL
CHLORIDE SERPL-SCNC: 113 MMOL/L (ref 94–109)
CO2 SERPL-SCNC: 27 MMOL/L (ref 20–32)
CREAT SERPL-MCNC: 0.9 MG/DL (ref 0.66–1.25)
GFR SERPL CREATININE-BSD FRML MDRD: 89 ML/MIN/{1.73_M2}
GLUCOSE SERPL-MCNC: 107 MG/DL (ref 70–99)
HGB BLD-MCNC: 6.7 G/DL (ref 13.3–17.7)
HGB BLD-MCNC: 6.8 G/DL (ref 13.3–17.7)
HGB BLD-MCNC: 7 G/DL (ref 13.3–17.7)
HGB BLD-MCNC: 7.5 G/DL (ref 13.3–17.7)
NUM BPU REQUESTED: 4
POTASSIUM SERPL-SCNC: 3.7 MMOL/L (ref 3.4–5.3)
SODIUM SERPL-SCNC: 143 MMOL/L (ref 133–144)
SPECIMEN EXP DATE BLD: NORMAL
TRANSFUSION STATUS PATIENT QL: NORMAL
UPPER GI ENDOSCOPY: NORMAL

## 2021-06-23 PROCEDURE — P9016 RBC LEUKOCYTES REDUCED: HCPCS | Performed by: EMERGENCY MEDICINE

## 2021-06-23 PROCEDURE — 99207 PR CDG-MDM COMPONENT: MEETS MODERATE - UP CODED: CPT | Performed by: INTERNAL MEDICINE

## 2021-06-23 PROCEDURE — 999N000099 HC STATISTIC MODERATE SEDATION < 10 MIN: Performed by: INTERNAL MEDICINE

## 2021-06-23 PROCEDURE — 250N000011 HC RX IP 250 OP 636: Performed by: INTERNAL MEDICINE

## 2021-06-23 PROCEDURE — 80048 BASIC METABOLIC PNL TOTAL CA: CPT | Performed by: PHYSICIAN ASSISTANT

## 2021-06-23 PROCEDURE — 85018 HEMOGLOBIN: CPT | Performed by: PHYSICIAN ASSISTANT

## 2021-06-23 PROCEDURE — 258N000003 HC RX IP 258 OP 636: Performed by: PHYSICIAN ASSISTANT

## 2021-06-23 PROCEDURE — 43235 EGD DIAGNOSTIC BRUSH WASH: CPT | Performed by: INTERNAL MEDICINE

## 2021-06-23 PROCEDURE — 36416 COLLJ CAPILLARY BLOOD SPEC: CPT | Performed by: PHYSICIAN ASSISTANT

## 2021-06-23 PROCEDURE — 36415 COLL VENOUS BLD VENIPUNCTURE: CPT | Performed by: PHYSICIAN ASSISTANT

## 2021-06-23 PROCEDURE — 85018 HEMOGLOBIN: CPT | Performed by: INTERNAL MEDICINE

## 2021-06-23 PROCEDURE — 36415 COLL VENOUS BLD VENIPUNCTURE: CPT | Performed by: INTERNAL MEDICINE

## 2021-06-23 PROCEDURE — 250N000013 HC RX MED GY IP 250 OP 250 PS 637: Performed by: INTERNAL MEDICINE

## 2021-06-23 PROCEDURE — 0DJ08ZZ INSPECTION OF UPPER INTESTINAL TRACT, VIA NATURAL OR ARTIFICIAL OPENING ENDOSCOPIC: ICD-10-PCS | Performed by: INTERNAL MEDICINE

## 2021-06-23 PROCEDURE — 250N000009 HC RX 250: Performed by: INTERNAL MEDICINE

## 2021-06-23 PROCEDURE — 250N000011 HC RX IP 250 OP 636: Performed by: PHYSICIAN ASSISTANT

## 2021-06-23 PROCEDURE — 120N000001 HC R&B MED SURG/OB

## 2021-06-23 PROCEDURE — 99233 SBSQ HOSP IP/OBS HIGH 50: CPT | Performed by: INTERNAL MEDICINE

## 2021-06-23 RX ORDER — PANTOPRAZOLE SODIUM 40 MG/1
40 TABLET, DELAYED RELEASE ORAL
Status: DISCONTINUED | OUTPATIENT
Start: 2021-06-23 | End: 2021-06-24 | Stop reason: HOSPADM

## 2021-06-23 RX ORDER — NALOXONE HYDROCHLORIDE 0.4 MG/ML
0.4 INJECTION, SOLUTION INTRAMUSCULAR; INTRAVENOUS; SUBCUTANEOUS
Status: DISCONTINUED | OUTPATIENT
Start: 2021-06-23 | End: 2021-06-24 | Stop reason: HOSPADM

## 2021-06-23 RX ORDER — NALOXONE HYDROCHLORIDE 0.4 MG/ML
0.2 INJECTION, SOLUTION INTRAMUSCULAR; INTRAVENOUS; SUBCUTANEOUS
Status: DISCONTINUED | OUTPATIENT
Start: 2021-06-23 | End: 2021-06-24 | Stop reason: HOSPADM

## 2021-06-23 RX ORDER — FENTANYL CITRATE 50 UG/ML
25 INJECTION, SOLUTION INTRAMUSCULAR; INTRAVENOUS EVERY 5 MIN PRN
Status: ACTIVE | OUTPATIENT
Start: 2021-06-23 | End: 2021-06-24

## 2021-06-23 RX ORDER — FLUMAZENIL 0.1 MG/ML
0.2 INJECTION, SOLUTION INTRAVENOUS
Status: DISCONTINUED | OUTPATIENT
Start: 2021-06-23 | End: 2021-06-24 | Stop reason: HOSPADM

## 2021-06-23 RX ORDER — FENTANYL CITRATE 50 UG/ML
50 INJECTION, SOLUTION INTRAMUSCULAR; INTRAVENOUS
Status: ACTIVE | OUTPATIENT
Start: 2021-06-23 | End: 2021-06-24

## 2021-06-23 RX ORDER — FLUMAZENIL 0.1 MG/ML
0.2 INJECTION, SOLUTION INTRAVENOUS
Status: ACTIVE | OUTPATIENT
Start: 2021-06-23 | End: 2021-06-23

## 2021-06-23 RX ORDER — FENTANYL CITRATE 50 UG/ML
INJECTION, SOLUTION INTRAMUSCULAR; INTRAVENOUS PRN
Status: COMPLETED | OUTPATIENT
Start: 2021-06-23 | End: 2021-06-23

## 2021-06-23 RX ORDER — LIDOCAINE 40 MG/G
CREAM TOPICAL
Status: DISCONTINUED | OUTPATIENT
Start: 2021-06-23 | End: 2021-06-23 | Stop reason: HOSPADM

## 2021-06-23 RX ADMIN — PANTOPRAZOLE SODIUM 40 MG: 40 TABLET, DELAYED RELEASE ORAL at 16:00

## 2021-06-23 RX ADMIN — MIDAZOLAM 2 MG: 1 INJECTION INTRAMUSCULAR; INTRAVENOUS at 09:07

## 2021-06-23 RX ADMIN — FENTANYL CITRATE 100 MCG: 50 INJECTION, SOLUTION INTRAMUSCULAR; INTRAVENOUS at 09:07

## 2021-06-23 RX ADMIN — MIDAZOLAM 2 MG: 1 INJECTION INTRAMUSCULAR; INTRAVENOUS at 09:11

## 2021-06-23 RX ADMIN — TOPICAL ANESTHETIC 1 EACH: 200 SPRAY DENTAL; PERIODONTAL at 09:06

## 2021-06-23 RX ADMIN — POTASSIUM CHLORIDE: 2 INJECTION, SOLUTION, CONCENTRATE INTRAVENOUS at 04:15

## 2021-06-23 ASSESSMENT — ACTIVITIES OF DAILY LIVING (ADL)
ADLS_ACUITY_SCORE: 16
DEPENDENT_IADLS:: INDEPENDENT
ADLS_ACUITY_SCORE: 16

## 2021-06-23 ASSESSMENT — MIFFLIN-ST. JEOR: SCORE: 2285.4

## 2021-06-23 NOTE — PLAN OF CARE
.PRIMARY DIAGNOSIS: GI BLEED    GOALS TO BE MET BEFORE DISCHARGE  1. Orthostatic performed: Yes:    2.       Lying Orthostatic BP: 132/76   3.       Sitting Orthostatic BP: 151/79   4.       Standing Orthostatic BP: 165/80     5. Stable Hgb  check hgb q 6hrs, pt received PRBC in ED .   Recent Labs   Lab Test 06/22/21  1820 06/22/21  1123   HGB 7.9* 7.3*       Resolved or declined bleeding episodes: pt continues to have loose, maroon/bloody stools.   6. Appropriate testing complete: No, pt will need EGD on 6/23.     7. Cleared for discharge by consultants (if involved): No    8. Safe discharge environment identified: Yes    Discharge Planner Nurse   Safe discharge environment identified: Yes  Barriers to discharge: Yes, pt will need to be medically cleared prior to discharge home.        Entered by: Arely Bob 06/22/2021 9:40 PM     Please review provider order for any additional goals.   Nurse to notify provider when observation goals have been met and patient is ready for discharge.

## 2021-06-23 NOTE — PROVIDER NOTIFICATION
DATE:  6/23/2021   TIME OF RECEIPT FROM LAB:  3742  LAB TEST:  HGB  LAB VALUE: 6.7  RESULTS GIVEN WITH READ-BACK TO (PROVIDER):  Arie Bledsoe MD  TIME LAB VALUE REPORTED TO PROVIDER:   [id number]

## 2021-06-23 NOTE — PLAN OF CARE
VSS. Denies pain. EGD done. IV SL. SBA. Tele SR. Plan to discharge tomorrow 6/24/21 if Hgb stable.

## 2021-06-23 NOTE — CONSULTS
GASTROENTEROLOGY CONSULTATION      Mason Garibay  [address] [facility] [id number]  65 year old male     Admission Date/Time: 6/22/2021  Primary Care Provider: No Ref-Primary, Physician     We were asked to see the patient in consultation by Dr. Bledsoe for evaluation of melena.    CC: syncope     HPI:  Mason Garibay is a 65 year old male with past medical history significant for hyperglycemia, obesity, positive PPD, history of melena with terminal ileum ulcers on small bowel pillcam in 2011, adenomatous polyps, admitted 6/22 with symptoms of syncopal episode, weakness, dizziness, and lightheadedness along with reported melena.     Patient reports over the last 4-5 days he has had progressively worsening weakness, dizziness upon standing, and lightheadedness. He noticed black tarry stools 2 days ago and had 2 episodes. He had another black stool yesterday but none since that time. He had an unwitnessed syncopal episode the day prior to admission. He denies any hematochezia, abdominal pain, nausea, vomiting, weight loss. He drinks alcohol very occasionally. Does not smoke. Takes Excedrin a couple times a week. No other NSAIDs.     On presentation to the hospital he was tachycardic with systolic -120 range. Hemoglobin 7.3 with normal MCV/platelets, elevated BUN 48 with normal creatinine. He was given a unit of blood in the ER. HGB trended 7.9-->6.9 and he received another unit overnight with repeat HGB this am 7. Vitals stable overnight. CT abd/pelvis was unremarkable.    In terms of his history, he had a incomplete colonoscopy due to a tortuous colon in 2011 at which time a tubular adenoma was removed and internal/external hemorrhoids noted. A follow up CT colonography showed 2 polyps. It was recommended he return in 5 years for repeat colonoscopy. IN June 2011 he presented for an outpatient EGD with symptoms of melena, which was normal. It was noted to be a difficult exam due to patient retching  prompting a small bowel pillcam showing multiple small aphthous ulcers in the terminal ileum without stigmata of recent bleeding, otherwise normal. This was felt likely to be medication induced and not Crohn's. It was recommended that he follow up in clinic but it appears this did not happen.        PAST MEDICAL HISTORY:  Patient Active Problem List    Diagnosis Date Noted     Syncope and collapse 06/22/2021     Priority: Medium     Pulmonary nodules 06/22/2021     Priority: Medium     Gastrointestinal hemorrhage, unspecified gastrointestinal hemorrhage type 06/22/2021     Priority: Medium          ROS: A comprehensive ten point review of systems was negative aside from those in mentioned in the HPI.       MEDICATIONS:   Prior to Admission medications    Medication Sig Start Date End Date Taking? Authorizing Provider   aspirin-acetaminophen-caffeine (EXCEDRIN MIGRAINE) 250-250-65 MG tablet Take 1 tablet by mouth every 6 hours as needed for headaches   Yes Unknown, Entered By History   multivitamin w/minerals (MULTI-VITAMIN) tablet Take 1 tablet by mouth daily   Yes Unknown, Entered By History        ALLERGIES:   Allergies   Allergen Reactions     No Known Drug Allergies         SOCIAL HISTORY:  Social History     Tobacco Use     Smoking status: Never Smoker   Substance Use Topics     Alcohol use: Yes     Comment: occasionally     Drug use: No        FAMILY HISTORY:  Family History   Problem Relation Age of Onset     Diabetes Mother         type 2     Hypertension Mother      Depression Mother      Thyroid Disease Father         unsure     Respiratory Father         emphysema-smoker     Genitourinary Problems Father         unsure        PHYSICAL EXAM:   BP (!) 167/73 (BP Location: Left arm)   Pulse 99   Temp 98.2  F (36.8  C) (Oral)   Resp 18   Ht 1.829 m (6')   Wt 146.2 kg (322 lb 6.4 oz)   SpO2 99%   BMI 43.73 kg/m       PHYSICAL EXAM:  General: alert, oriented, NAD  SKIN: no suspicious lesions, rashes,  jaundice, or spider angiomas  HEAD: Normocephalic. No masses, lesions, tenderness or abnormalities  NECK: Neck supple. No adenopathy. Thyroid symmetric, normal size.  EYES: No scleral icterus  ENT: ENT exam normal, no neck nodes or sinus tenderness  RESPIRATORY: negative, Good diaphragmatic excursion. Lungs clear  CARDIOVASCULAR: negative, PMI normal. No lifts, heaves, or thrills. RRR. No murmurs, clicks gallops or rub  GASTROINTESTINAL: +BS, soft, NT, ND, no HSM, no masses/guarding/rebound  JOINT/EXTREMITIES: extremities normal- no gross deformities noted, gait normal and normal muscle tone  NEURO: Reflexes grossly normal and symmetric. Sensation grossly WNL.  PSYCH: no abnormal anxiety/depression  LYMPH: No anterior cervical, posterior cervical, or supraclavicular adenopathy     LABS:  I reviewed the patient's new clinical lab test results.   Recent Labs   Lab Test 06/23/21  0657 06/23/21  0042 06/22/21  1820 06/22/21  1123   WBC  --   --   --  19.0*   HGB 7.0* 6.8* 7.9* 7.3*   MCV  --   --   --  84   PLT  --   --   --  253   INR  --   --   --  1.06     Recent Labs   Lab Test 06/23/21  0657 06/22/21  1123    140   POTASSIUM 3.7 3.8   CHLORIDE 113* 108   CO2 27 23   BUN 32* 48*   ANIONGAP 3 9   BINDU 7.4* 7.9*     Recent Labs   Lab Test 06/22/21  1123   ALBUMIN 2.9*   BILITOTAL 0.8   ALT 29   AST 16   ALKPHOS 64        IMAGING  I personally reviewed the patient's new imaging results.  CT ABDOMEN AND PELVIS WITH CONTRAST   6/22/2021 1:39 PM      HISTORY: Black stool, possible diverticulitis suspected.     TECHNIQUE:  CT abdomen and pelvis with 100mL Isovue-370 IV. Radiation  dose for this scan was reduced using automated exposure control,  adjustment of the mA and/or kV according to patient size, or iterative  reconstruction technique.     COMPARISON: None available     FINDINGS:  Lower chest: A few basilar pulmonary nodules including 4 mm right  lower lobe nodule (series 4 image 2) and 4 mm right middle lobe  nodule  (series 4 image 7).     Abdomen/pelvis:     Hepatobiliary: A few subcentimeter hypoattenuating foci in the liver,  too small to characterize. The gallbladder is unremarkable.     Pancreas: No main pancreatic ductal dilatation or definite solid  pancreatic mass.     Spleen: No splenomegaly. Moderate-sized splenule along the splenic  hilum.     Adrenal glands: No adrenal nodules.     Kidneys: No radiodense kidney stones or hydronephrosis in either  kidney.     Bowel: No abnormally dilated bowel loops. The appendix is visualized  and appears normal.     Peritoneum: No significant free fluid in the abdomen or pelvis. No  free peritoneal or portal venous gas.     Pelvic organs: Unremarkable.     Vascular: Scattered atherosclerotic vascular calcification of the  abdominal aorta and iliac vessels.     Lymph nodes: Multiple prominent inguinal lymph nodes, indeterminate,  could be reactive, otherwise no significant abdominopelvic  lymphadenopathy.     Bones and soft tissue: Mild degenerative changes of the spine.  Moderate size fat-containing umbilical hernia. Multiple small  groundglass nodular opacities in the subcutaneous tissue of the  anterior abdominal wall, could be related to medication injections.                                                                      IMPRESSION:   1. No evidence of acute pathology in the abdomen or pelvis.  2. A few basilar pulmonary nodules including 4 mm right middle and  lower lobe nodules , as per Fleischner's society criteria, for low  risk patient no routine follow-up is recommended, and for high-risk  patient, optional chest CT in 12 months can be considered.  3. Multiple small nodular groundglass opacities in the subcutaneous  tissue of the anterior abdominal wall, likely related to medications  injections.       CONSULTATION ASSESSMENT AND PLAN:    65 year old male with past medical history significant for hyperglycemia, obesity, positive PPD, history of melena with  terminal ileum ulcers on small bowel pillcam in 2011, adenomatous polyps, admitted 6/22 with symptoms of syncopal episode, weakness, dizziness, and lightheadedness along with reported melena.     1. Melena. 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 HGB 7.3 on admit and dropped to 6.9 despite one unit of blood. Has received total of 2 units thus far. EGD normal in 2011 for melena with follow up pillcam showing nonspecific, small terminal ileum ulcers, likely NSAID induced rather than Crohn's. Therefore small bowel bleeding possible.   --Monitor HGB and transfuse prn.   --Continue PPI drip.  --EGD today.   --NPO.     Thank you for asking us to participate in the care of this patient.    Malorie Carlos, PAC  [facility] ([facility])

## 2021-06-23 NOTE — PLAN OF CARE
VS stable. No complaints of pain. No stools. Tele is sinus rhythm. Receiving one unit of PRBC for Hgb of 6.8. Slept on and off throughout the night.

## 2021-06-23 NOTE — PROGRESS NOTES
[facility]  Pre-Endoscopy History and Physical     Mason Garibay MRN# [id number]   YOB: 1955 Age: 65 year old   Today's Date: 06/23/2021    Date of Procedure: 6/22/2021  Primary care provider: No Ref-Primary, Physician  Type of Endoscopy: esophagogastroduodenoscopy (upper GI endoscopy)  Reason for Procedure: Melena  Type of Anesthesia Anticipated: Moderate (conscious) sedation    HPI:    Mason is a 65 year old male who will be undergoing the above procedure.      A history and physical has been performed. The patient's medications and allergies have been reviewed. The risks and benefits of the procedure and the sedation options and risks were discussed with the patient.  All questions were answered and informed consent was obtained.      Allergies   Allergen Reactions     No Known Drug Allergies         Current Facility-Administered Medications   Medication     acetaminophen (TYLENOL) tablet 650 mg     bisacodyl (DULCOLAX) Suppository 10 mg     fentaNYL (PF) (SUBLIMAZE) injection 50 mcg    Followed by     fentaNYL (PF) (SUBLIMAZE) injection 25 mcg     flumazenil (ROMAZICON) injection 0.2 mg     lactated ringers 1,000 mL with potassium chloride 20 mEq/L infusion     lidocaine (LMX4) cream     lidocaine (LMX4) cream     lidocaine 1 % 0.1-1 mL     lidocaine 1 % 0.1-1 mL     magnesium hydroxide (MILK OF MAGNESIA) suspension 30 mL     May continue current IV fluids if patient has IV fluids infusing.     May take regular AM medications except those listed below     melatonin tablet 5 mg     midazolam (VERSED) injection 1 mg    Followed by     midazolam (VERSED) injection 0.5 mg     naloxone (NARCAN) injection 0.2 mg     naloxone (NARCAN) injection 0.2 mg     naloxone (NARCAN) injection 0.4 mg     naloxone (NARCAN) injection 0.4 mg     ondansetron (ZOFRAN-ODT) ODT tab 4 mg    Or     ondansetron (ZOFRAN) injection 4 mg     pantoprazole (PROTONIX) 80 mg in sodium chloride 0.9 % 100 mL infusion      prochlorperazine (COMPAZINE) injection 5 mg    Or     prochlorperazine (COMPAZINE) tablet 5 mg    Or     prochlorperazine (COMPAZINE) suppository 12.5 mg     senna-docusate (SENOKOT-S/PERICOLACE) 8.6-50 MG per tablet 1 tablet    Or     senna-docusate (SENOKOT-S/PERICOLACE) 8.6-50 MG per tablet 2 tablet     sodium chloride (PF) 0.9% PF flush 3 mL     sodium chloride (PF) 0.9% PF flush 3 mL     sodium chloride (PF) 0.9% PF flush 3 mL     sodium chloride (PF) 0.9% PF flush 3 mL       Patient Active Problem List   Diagnosis     Syncope and collapse     Pulmonary nodules     Gastrointestinal hemorrhage, unspecified gastrointestinal hemorrhage type        History reviewed. No pertinent past medical history.     Past Surgical History:   Procedure Laterality Date     ENT SURGERY  2014    3-4 nasal polyps removed     ZZC NONSPECIFIC PROCEDURE      Nasal polyp surgery       Social History     Tobacco Use     Smoking status: Never Smoker     Smokeless tobacco: Never Used   Substance Use Topics     Alcohol use: Yes     Comment: occasionally to rare       Family History   Problem Relation Age of Onset     Diabetes Mother         type 2     Hypertension Mother      Depression Mother      Thyroid Disease Father         unsure     Respiratory Father         emphysema-smoker     Genitourinary Problems Father         unsure     Diabetes Sister      Melanoma Sister          PHYSICAL EXAM:   /71   Pulse 90   Temp 97.9  F (36.6  C) (Temporal)   Resp 11   Ht 1.829 m (6')   Wt 146.2 kg (322 lb 6.4 oz)   SpO2 100%   BMI 43.73 kg/m   Estimated body mass index is 43.73 kg/m  as calculated from the following:    Height as of this encounter: 1.829 m (6').    Weight as of this encounter: 146.2 kg (322 lb 6.4 oz).   RESP: lungs clear to auscultation - no rales, rhonchi or wheezes  CV: regular rates and rhythm    IMPRESSION   ASA Class 3 - Severe systemic disease, but not incapacitating  Mallampati Score: 3      Levi Diaz  MD Chris

## 2021-06-23 NOTE — PROVIDER NOTIFICATION
06/23/21 0111   Critical Test Results/Notification   Critical Lab Result (Lab Name and Value) hgb 6.8   What Time Did The Lab Notify You? 0110   What Provider Did You Notify? on call MD   Was the Provider Notified Within 30 Min?  Yes     303 NT- critical hgb of 6.8. Have conditional orders to give one unit of PRBC for hgb less than 7.0. Thank you

## 2021-06-23 NOTE — PROGRESS NOTES
[facility]  Hospitalist Progress Note  Arie Bledsoe MD 06/23/2021    Reason for Stay (Diagnosis): UGIB         Assessment and Plan:      Summary of Stay: Mason Garibay is a 65 year old male admitted on 6/22/2021 with melena.  Tachycardic in ER with o/w stable vital signs.  Labs notable for hgb near 7.      Pt has been transfused 2 unit PRBC since admit.  EGD done today shows non-bleeding gastric ulcer, likely NSAID induced    Problem List:   1)  UGIB with non-bleeding gastric ulcer on EGD  2)  Acute blood loss anemia due to UGIB.  S/p 2 unit PRBC  3)  Near syncope sx due to GIB     Plan:  - change PPI gtt to PO bid  - regular diet  - repeat hgb this evening and again in AM.  tx for hgb <7 (conditional orders placed for tx)  - likely discharge tomorrow if remains stable overnight      DVT Prophylaxis: Low Risk/Ambulatory with no VTE prophylaxis indicated  Code Status: Full Code  Discharge Dispo: home  Estimated Disch Date / # of Days until Disch: tomorrow        Interval History (Subjective):      Feels a bit groggy after EGD earlier today; o/w no complaints.                    Physical Exam:      Last Vital Signs:  /64 (BP Location: Left arm)   Pulse 84   Temp 97.9  F (36.6  C) (Temporal)   Resp 16   Ht 1.829 m (6')   Wt 146.2 kg (322 lb 6.4 oz)   SpO2 95%   BMI 43.73 kg/m        Intake/Output Summary (Last 24 hours) at 6/23/2021 1215  Last data filed at 6/23/2021 0517  Gross per 24 hour   Intake 2105 ml   Output --   Net 2105 ml       Constitutional: Awake, alert, cooperative, no apparent distress   Respiratory: Clear to auscultation bilaterally, no crackles or wheezing   Cardiovascular: Regular rate and rhythm, normal S1 and S2, and no murmur noted   Abdomen: Normal bowel sounds, soft, non-distended, non-tender   Skin: No rashes, no cyanosis, dry to touch   Neuro: Alert and oriented x3, no weakness, numbness, memory loss   Extremities: No edema, normal range of motion   Other(s):         All other systems: Negative          Medications:      All current medications were reviewed with changes reflected in problem list.         Data:      All new lab and imaging data was reviewed.   Labs:  Recent Labs   Lab 06/23/21  0657 06/22/21  1123 06/22/21  1123   WBC  --   --  19.0*   HGB 7.0*   < > 7.3*   HCT  --   --  23.3*   MCV  --   --  84   PLT  --   --  253    < > = values in this interval not displayed.      Imaging:   Recent Results (from the past 24 hour(s))   CT Abdomen Pelvis w Contrast    Narrative    CT ABDOMEN AND PELVIS WITH CONTRAST   6/22/2021 1:39 PM     HISTORY: Black stool, possible diverticulitis suspected.    TECHNIQUE:  CT abdomen and pelvis with 100mL Isovue-370 IV. Radiation  dose for this scan was reduced using automated exposure control,  adjustment of the mA and/or kV according to patient size, or iterative  reconstruction technique.    COMPARISON: None available    FINDINGS:  Lower chest: A few basilar pulmonary nodules including 4 mm right  lower lobe nodule (series 4 image 2) and 4 mm right middle lobe nodule  (series 4 image 7).    Abdomen/pelvis:    Hepatobiliary: A few subcentimeter hypoattenuating foci in the liver,  too small to characterize. The gallbladder is unremarkable.    Pancreas: No main pancreatic ductal dilatation or definite solid  pancreatic mass.    Spleen: No splenomegaly. Moderate-sized splenule along the splenic  hilum.    Adrenal glands: No adrenal nodules.    Kidneys: No radiodense kidney stones or hydronephrosis in either  kidney.    Bowel: No abnormally dilated bowel loops. The appendix is visualized  and appears normal.    Peritoneum: No significant free fluid in the abdomen or pelvis. No  free peritoneal or portal venous gas.    Pelvic organs: Unremarkable.    Vascular: Scattered atherosclerotic vascular calcification of the  abdominal aorta and iliac vessels.    Lymph nodes: Multiple prominent inguinal lymph nodes, indeterminate,  could be  reactive, otherwise no significant abdominopelvic  lymphadenopathy.    Bones and soft tissue: Mild degenerative changes of the spine.  Moderate size fat-containing umbilical hernia. Multiple small  groundglass nodular opacities in the subcutaneous tissue of the  anterior abdominal wall, could be related to medication injections.      Impression    IMPRESSION:   1. No evidence of acute pathology in the abdomen or pelvis.  2. A few basilar pulmonary nodules including 4 mm right middle and  lower lobe nodules , as per Fleischner's society criteria, for low  risk patient no routine follow-up is recommended, and for high-risk  patient, optional chest CT in 12 months can be considered.  3. Multiple small nodular groundglass opacities in the subcutaneous  tissue of the anterior abdominal wall, likely related to medications  injections.    CHARAN RODRIGUEZ MD

## 2021-06-23 NOTE — CONSULTS
Care Management Initial Consult    General Information  Assessment completed with: PatientMason  Type of CM/SW Visit: Initial Assessment    Primary Care Provider verified and updated as needed: No(Provided patient with choices for PCP)   Readmission within the last 30 days: no previous admission in last 30 days   Return Category: New Diagnosis  Reason for Consult: care coordination/care conference, discharge planning  Advance Care Planning: Advance Care Planning Reviewed: no concerns identified        Communication Assessment  Patient's communication style: spoken language (English or Bilingual)([place] Island[place] as first language)    Hearing Difficulty or Deaf: no   Wear Glasses or Blind: no    Cognitive  Cognitive/Neuro/Behavioral: WDL                      Living Environment:   People in home: alone     Current living Arrangements: house      Able to return to prior arrangements: yes     Family/Social Support:  Care provided by: self  Provides care for: no one  Marital Status:   Children, Neighbor, Other (specify)(Friend)          Description of Support System: Supportive, Involved       Current Resources:   Patient receiving home care services: No     Community Resources: None  Equipment currently used at home: none  Supplies currently used at home: None    Employment/Financial:  Employment Status: retired        Financial Concerns: No concerns identified     Lifestyle & Psychosocial Needs:    Socioeconomic History     Marital status:      Spouse name: Not on file     Number of children: Not on file     Years of education: Not on file     Highest education level: Not on file     Tobacco Use     Smoking status: Never Smoker   Substance and Sexual Activity     Alcohol use: Yes     Comment: occasionally     Drug use: No     Sexual activity: Yes     Partners: Female     Functional Status:  Prior to admission patient needed assistance:   Dependent ADLs:: Independent  Dependent IADLs:: Independent     Mental  Health Status:  Mental Health Status: No Current Concerns       Chemical Dependency Status:  Chemical Dependency Status: No Current Concerns           Values/Beliefs:  Spiritual, Cultural Beliefs, Alevism Practices, Values that affect care: no             Additional Information:  Patient admitted for GI bleed. CM met with patient at bedside. Introduced self & role. Verified address. No current PCP. Patient lives in a house. He has no issues with stairs normally. Due to current weakness, he was struggling some. He verified that he has not been seeing a PCP since he doesn't have prescription medications and has had no medical issues. He is retired. He is independent and does not use any assistive equipment. No home care services. He has a friend that is involved and supportive along with a neighbor he can call if needed. He has 2 sons; one lives in   [place] and the other lives in the [facility] with his wife. The local son & RUBEN are also supportive and involved.   CM provided patient with resources on local medical clinics in [place], [place]: [facility], [facility] & [facility]. He has used [facility] before and declines to return there. He will review and decide which health system to establish with.     CM will continue to follow patient until discharge for any additional needs.     Nancy Trivedi RN, BSN, CPHN, CM  Inpatient Care Coordination - M/[facility], [facility]  [phone]

## 2021-06-23 NOTE — PROGRESS NOTES
Cross cover notified of hemoglobin of 6.8.  Down from 7.9 earlier in the evening.  Did receive transfusion earlier for suspected GI bleed.  Has not had hypotension.  Mildly tachycardia persists.  He will be transfused additional 1 unit RBCs now.

## 2021-06-24 VITALS
HEIGHT: 72 IN | RESPIRATION RATE: 19 BRPM | WEIGHT: 315 LBS | BODY MASS INDEX: 42.66 KG/M2 | TEMPERATURE: 98.4 F | SYSTOLIC BLOOD PRESSURE: 114 MMHG | HEART RATE: 97 BPM | DIASTOLIC BLOOD PRESSURE: 52 MMHG | OXYGEN SATURATION: 96 %

## 2021-06-24 LAB — HGB BLD-MCNC: 7.5 G/DL (ref 13.3–17.7)

## 2021-06-24 PROCEDURE — 99238 HOSP IP/OBS DSCHRG MGMT 30/<: CPT | Performed by: INTERNAL MEDICINE

## 2021-06-24 PROCEDURE — 85018 HEMOGLOBIN: CPT | Performed by: INTERNAL MEDICINE

## 2021-06-24 PROCEDURE — 250N000013 HC RX MED GY IP 250 OP 250 PS 637: Performed by: INTERNAL MEDICINE

## 2021-06-24 PROCEDURE — 36415 COLL VENOUS BLD VENIPUNCTURE: CPT | Performed by: INTERNAL MEDICINE

## 2021-06-24 RX ORDER — PANTOPRAZOLE SODIUM 40 MG/1
TABLET, DELAYED RELEASE ORAL
Qty: 60 TABLET | Refills: 3 | Status: SHIPPED | OUTPATIENT
Start: 2021-06-24

## 2021-06-24 RX ADMIN — PANTOPRAZOLE SODIUM 40 MG: 40 TABLET, DELAYED RELEASE ORAL at 06:06

## 2021-06-24 ASSESSMENT — ACTIVITIES OF DAILY LIVING (ADL)
ADLS_ACUITY_SCORE: 17

## 2021-06-24 ASSESSMENT — MIFFLIN-ST. JEOR: SCORE: 2284.04

## 2021-06-24 NOTE — PROVIDER NOTIFICATION
"10:37 PM   Paged MD: \"Completed 1 unit blood. Hgb lab scheduled in the AM. Do you want a recheck tonight? Also had 1 stool black & tarry. Thanks.\"    Hgb 7.5 at 2325.   "

## 2021-06-24 NOTE — PROGRESS NOTES
AOx4. VS wnl. Transfused 1 unit. C/o left flank pain 1-2/10. MD notified, advised to monitor. Rechecked Hgb, 7.5 at 2325. Tele SR. RA. Tolerating regular diet. Ambulates SBA. Voids in bathroom. Had 1x black tarry stool. MD notified. No more c/o of flank pain. Plan for DC today if stable.

## 2021-06-24 NOTE — PROGRESS NOTES
Pt seen and examined.  Had some tachycardia mobilizing in halls this AM o/w no complaints.  No LH/dizzy sx.  hgb stable since tx yesterday at 7.5.  Had dark BM overnight; suspect residual blood from recent GIB    Appears stable for discharge today.  GI planning on repeat EGD in 8 weeks

## 2021-06-24 NOTE — PROVIDER NOTIFICATION
7:32 PM  Paged admitting MD  Patient has blood transfusing. Complaining of new left flank pain, rating it 1-2/10. ?Transfusion reaction? Please advise. Thank you.     7:38 PM  Spoke with Dr. Early. MD advised to continue to observe patient at this time.

## 2021-06-24 NOTE — PLAN OF CARE
Alert and oriented.  VSS.  Tele SR. HR: 90s.  Pain denied. Hgb: 7.5 x2.  No bowel movements this shift. Plan to discharge home today. Discharge instructions reviewed. Belongings / medications sent with patient.

## 2021-06-24 NOTE — DISCHARGE SUMMARY
Service Date: 06/24/2021  Discharge Date: 06/24/2021    PRINCIPAL FINAL DIAGNOSES:     1.  Acute blood loss anemia due to upper gastrointestinal bleed related to gastric ulcer.  2.  Near syncope due to orthostatic symptoms related to acute blood loss anemia.  3.  Lactic acidosis due to acute blood loss anemia  4.  Non-infectious systemic inflammatory response syndrome due to acute blood loss anemia    PRINCIPAL PROCEDURES THIS ADMISSION:  1.  Three units packed red blood cell transfusion.  2.  GI consultation.  3.  Abdominal pelvic CT scan.  4.  Upper endoscopy, which showed nonobstructing, nonbleeding gastric ulcer, suspected to be NSAID-induced etiology.  5.  Pantoprazole drip.    REASON FOR ADMISSION:  Please see dictated history and physical.  In brief, Mr. Garibay is a 65-year-old male who presented to the hospital with melena. On presentation, he was tachycardic, but otherwise with stable vital signs.  Hemoglobin was near 7 on presentation.  He was admitted to the hospitalist service.    HOSPITAL COURSE:  Acute blood loss anemia due to upper GI bleed from gastric ulcer.  Besides tachycardia, the patient was hemodynamically stable.  He received a total of 3 units packed red blood cell transfusion this admission with hemoglobin eventually stabilized at 7.5.  GI was consulted and performed an EGD on the second day of hospitalization that showed a nonbleeding gastric ulcer.  NSAID-induced ulcer is suspected.  GI recommended pantoprazole 40 mg twice a day for 2 months, then once daily thereafter indefinitely.  They recommend repeat EGD in 2 months.  They will arrange this for the patient.  The patient appeared medically stable for discharge from the hospital today.    I instructed the patient to return to the hospital if he has any bloody stool.  I also advised him that his next several stools will likely continue to be dark in color, but if this persists, he should return to the ER for evaluation.  I explained the  stool should gradually lighten up over the next several bowel movements and if they do not, he should seek evaluation.    He was advised to avoid any NSAID medications going forward.    DISCHARGE MEDICATIONS:     1.  Multivitamin daily.  2.  Pantoprazole 40 mg twice a day for 2 months, then reduce dose and continue to take 40 mg once a day thereafter.    FOLLOWUP INSTRUCTIONS:     1.  See primary MD in 5-7 days.  Recheck hemoglobin at that visit.  Hemoglobin is 7.5 on discharge from the hospital.  2.  Avoid taking any NSAID medications.  These include aspirin, ibuprofen, Naprosyn.  Acetaminophen is okay to take.  3.  Return to the hospital for any bloody stools.  We anticipate that your next several bowel movements will still be dark in color but if these do not lighten up in color, then return to the ER for evaluation.  4.  [facility] will contact you to arrange a repeat EGD in 2 months.    The patient is discharged to home today.    I examined the patient on day of discharge.      Arie Bledsoe MD        D: 2021   T: 2021   MT: [email or web address]    Name:     CALEB ELLIOTT  MRN:      [id number]        Account:      [id number]   :      1955           Service Date: 2021                                  Discharge Date: 2021     Document: [id number]

## 2021-06-28 LAB
BACTERIA SPEC CULT: NO GROWTH
BACTERIA SPEC CULT: NO GROWTH
Lab: NORMAL
Lab: NORMAL
SPECIMEN SOURCE: NORMAL
SPECIMEN SOURCE: NORMAL

## (undated) DEVICE — KIT ENDO TURNOVER/PROCEDURE W/CLEAN A SCOPE LINERS 103888

## (undated) DEVICE — ENDO BITE BLOCK ADULT OLYMPUS LATEX FREE MAJ-1632

## (undated) RX ORDER — FENTANYL CITRATE 50 UG/ML
INJECTION, SOLUTION INTRAMUSCULAR; INTRAVENOUS
Status: DISPENSED
Start: 2021-06-23